# Patient Record
Sex: FEMALE | Race: WHITE | NOT HISPANIC OR LATINO | Employment: FULL TIME | URBAN - NONMETROPOLITAN AREA
[De-identification: names, ages, dates, MRNs, and addresses within clinical notes are randomized per-mention and may not be internally consistent; named-entity substitution may affect disease eponyms.]

---

## 2018-12-19 ENCOUNTER — HOSPITAL ENCOUNTER (OUTPATIENT)
Dept: GENERAL RADIOLOGY | Facility: HOSPITAL | Age: 38
Discharge: HOME | End: 2018-12-19
Admitting: OBSTETRICS & GYNECOLOGY

## 2018-12-19 ENCOUNTER — OFFICE VISIT (OUTPATIENT)
Dept: OBSTETRICS AND GYNECOLOGY | Facility: CLINIC | Age: 38
End: 2018-12-19

## 2018-12-19 ENCOUNTER — APPOINTMENT (OUTPATIENT)
Dept: PREADMISSION TESTING | Facility: HOSPITAL | Age: 38
End: 2018-12-19

## 2018-12-19 ENCOUNTER — PREP FOR SURGERY (OUTPATIENT)
Dept: OTHER | Facility: HOSPITAL | Age: 38
End: 2018-12-19

## 2018-12-19 VITALS
BODY MASS INDEX: 47.09 KG/M2 | DIASTOLIC BLOOD PRESSURE: 80 MMHG | WEIGHT: 293 LBS | SYSTOLIC BLOOD PRESSURE: 148 MMHG | HEIGHT: 66 IN

## 2018-12-19 VITALS — BODY MASS INDEX: 47.09 KG/M2 | HEIGHT: 66 IN | WEIGHT: 293 LBS

## 2018-12-19 DIAGNOSIS — T83.32XA INTRAUTERINE CONTRACEPTIVE DEVICE THREADS LOST, INITIAL ENCOUNTER: Primary | ICD-10-CM

## 2018-12-19 DIAGNOSIS — T83.32XA INTRAUTERINE DEVICE (IUD) MIGRATION, INITIAL ENCOUNTER: ICD-10-CM

## 2018-12-19 DIAGNOSIS — T83.32XA INTRAUTERINE CONTRACEPTIVE DEVICE THREADS LOST, INITIAL ENCOUNTER: ICD-10-CM

## 2018-12-19 LAB
ANION GAP SERPL CALCULATED.3IONS-SCNC: 11.3 MMOL/L (ref 10–20)
B-HCG UR QL: NEGATIVE
BILIRUB UR QL STRIP: NEGATIVE
BUN BLD-MCNC: 11 MG/DL (ref 7–20)
BUN/CREAT SERPL: 18.3 (ref 7.1–23.5)
CALCIUM SPEC-SCNC: 9.3 MG/DL (ref 8.4–10.2)
CHLORIDE SERPL-SCNC: 105 MMOL/L (ref 98–107)
CLARITY UR: CLEAR
CO2 SERPL-SCNC: 28 MMOL/L (ref 26–30)
COLOR UR: YELLOW
CREAT BLD-MCNC: 0.6 MG/DL (ref 0.6–1.3)
DEPRECATED RDW RBC AUTO: 47 FL (ref 37–54)
ERYTHROCYTE [DISTWIDTH] IN BLOOD BY AUTOMATED COUNT: 14.6 % (ref 11.5–14.5)
GFR SERPL CREATININE-BSD FRML MDRD: 112 ML/MIN/1.73
GLUCOSE BLD-MCNC: 136 MG/DL (ref 74–98)
GLUCOSE UR STRIP-MCNC: NEGATIVE MG/DL
HCT VFR BLD AUTO: 35.4 % (ref 37–47)
HGB BLD-MCNC: 11.1 G/DL (ref 12–16)
HGB UR QL STRIP.AUTO: NEGATIVE
KETONES UR QL STRIP: NEGATIVE
LEUKOCYTE ESTERASE UR QL STRIP.AUTO: NEGATIVE
MCH RBC QN AUTO: 27.3 PG (ref 27–31)
MCHC RBC AUTO-ENTMCNC: 31.4 G/DL (ref 30–37)
MCV RBC AUTO: 87.2 FL (ref 81–99)
NITRITE UR QL STRIP: NEGATIVE
PH UR STRIP.AUTO: 7.5 [PH] (ref 5–8)
PLATELET # BLD AUTO: 354 10*3/MM3 (ref 130–400)
PMV BLD AUTO: 9.6 FL (ref 6–12)
POTASSIUM BLD-SCNC: 4.3 MMOL/L (ref 3.5–5.1)
PROT UR QL STRIP: NEGATIVE
RBC # BLD AUTO: 4.06 10*6/MM3 (ref 4.2–5.4)
SODIUM BLD-SCNC: 140 MMOL/L (ref 137–145)
SP GR UR STRIP: 1.02 (ref 1–1.03)
UROBILINOGEN UR QL STRIP: NORMAL
WBC NRBC COR # BLD: 15.91 10*3/MM3 (ref 4.8–10.8)

## 2018-12-19 PROCEDURE — 85027 COMPLETE CBC AUTOMATED: CPT | Performed by: OBSTETRICS & GYNECOLOGY

## 2018-12-19 PROCEDURE — 80048 BASIC METABOLIC PNL TOTAL CA: CPT | Performed by: OBSTETRICS & GYNECOLOGY

## 2018-12-19 PROCEDURE — 36415 COLL VENOUS BLD VENIPUNCTURE: CPT

## 2018-12-19 PROCEDURE — 81025 URINE PREGNANCY TEST: CPT | Performed by: OBSTETRICS & GYNECOLOGY

## 2018-12-19 PROCEDURE — 99203 OFFICE O/P NEW LOW 30 MIN: CPT | Performed by: PHYSICIAN ASSISTANT

## 2018-12-19 PROCEDURE — 81003 URINALYSIS AUTO W/O SCOPE: CPT | Performed by: OBSTETRICS & GYNECOLOGY

## 2018-12-19 PROCEDURE — 74018 RADEX ABDOMEN 1 VIEW: CPT

## 2018-12-19 RX ORDER — SODIUM CHLORIDE 0.9 % (FLUSH) 0.9 %
3 SYRINGE (ML) INJECTION EVERY 12 HOURS SCHEDULED
Status: CANCELLED | OUTPATIENT
Start: 2018-12-19

## 2018-12-19 RX ORDER — DULOXETIN HYDROCHLORIDE 30 MG/1
60 CAPSULE, DELAYED RELEASE ORAL DAILY
Refills: 0 | COMMUNITY
Start: 2018-09-24 | End: 2020-08-07 | Stop reason: DRUGHIGH

## 2018-12-19 RX ORDER — INFLUENZA A VIRUS A/MICHIGAN/45/2015 X-275 (H1N1) ANTIGEN (FORMALDEHYDE INACTIVATED), INFLUENZA A VIRUS A/SINGAPORE/INFIMH-16-0019/2016 IVR-186 (H3N2) ANTIGEN (FORMALDEHYDE INACTIVATED), INFLUENZA B VIRUS B/PHUKET/3073/2013 ANTIGEN (FORMALDEHYDE INACTIVATED), AND INFLUENZA B VIRUS B/MARYLAND/15/2016 BX-69A ANTIGEN (FORMALDEHYDE INACTIVATED) 15; 15; 15; 15 UG/.5ML; UG/.5ML; UG/.5ML; UG/.5ML
INJECTION, SUSPENSION INTRAMUSCULAR
Refills: 0 | COMMUNITY
Start: 2018-12-17 | End: 2019-11-20

## 2018-12-19 RX ORDER — SODIUM CHLORIDE 0.9 % (FLUSH) 0.9 %
1-10 SYRINGE (ML) INJECTION AS NEEDED
Status: CANCELLED | OUTPATIENT
Start: 2018-12-19

## 2018-12-19 NOTE — PROGRESS NOTES
Subjective   Chief Complaint   Patient presents with   • Contraception     IUD removal; family planning       Kiley Hollingsworth is a 38 y.o. year old new patient  presenting to be seen for removal of Mirena which was inserted 2014.  She is contemplating another pregnancy but not 100% sure yet but she has decided wants IUD removed  She reports is having regular monthly bleeds with Mirena that last 3-4 days  She has recently come off cymbalta 4-6 weeks ago and thus far is doing well  She has no complaints today  Her last pap was 2013     Past Medical History:   Diagnosis Date   • Anxiety    • Body piercing     BOTH EARS   • Borderline glaucoma    • Depression    • Encounter for insertion of mirena IUD 2014    HIRAL MUSA   • History of Papanicolaou smear of cervix 2015    NORMAL    • Hypertension    • Wears glasses         Current Outpatient Medications:   •  FLUZONE QUADRIVALENT 0.5 ML suspension prefilled syringe injection, inject 0.5 milliliter intramuscularly, Disp: , Rfl: 0  •  DULoxetine (CYMBALTA) 30 MG capsule, Take 30 mg by mouth Daily., Disp: , Rfl: 0  •  Prenatal Vit-Fe Fumarate-FA (PRENATAL VITAMIN PO), Take 1 tablet by mouth Daily., Disp: , Rfl:    No Known Allergies   Past Surgical History:   Procedure Laterality Date   •  SECTION PRIMARY  2014    DR JORJE MERAZ   • ENDOSCOPY     • GALLBLADDER SURGERY     • LAPAROSCOPIC CHOLECYSTECTOMY     • WISDOM TOOTH EXTRACTION        Social History     Socioeconomic History   • Marital status: Single     Spouse name: Not on file   • Number of children: Not on file   • Years of education: Not on file   • Highest education level: Not on file   Social Needs   • Financial resource strain: Not on file   • Food insecurity - worry: Not on file   • Food insecurity - inability: Not on file   • Transportation needs - medical: Not on file   • Transportation needs - non-medical: Not on file   Occupational History   • Not on file  "  Tobacco Use   • Smoking status: Never Smoker   • Smokeless tobacco: Never Used   Substance and Sexual Activity   • Alcohol use: No     Frequency: Never   • Drug use: No   • Sexual activity: Defer   Other Topics Concern   • Not on file   Social History Narrative   • Not on file      Family History   Problem Relation Age of Onset   • Diabetes Father    • Diabetes Mother    • Coronary artery disease Mother    • Hypertension Mother    • Uterine cancer Paternal Grandmother    • Lung cancer Maternal Grandfather        Review of Systems   Constitutional: Negative.    Gastrointestinal: Negative.    Genitourinary: Negative.    All other systems reviewed and are negative.          Objective   /80   Ht 167.6 cm (66\")   Wt (!) 150 kg (330 lb 12.8 oz)   LMP 12/03/2018 (Exact Date)   Breastfeeding? No   BMI 53.39 kg/m²     Physical Exam   Constitutional: She is cooperative. No distress.   Morbidly obese   Eyes: Conjunctivae and lids are normal.   Abdominal: Soft. Bowel sounds are normal. There is no tenderness. There is no rigidity and no guarding.   Genitourinary: Vagina normal and uterus normal. There is no tenderness or lesion on the right labia. There is no tenderness or lesion on the left labia. Cervix exhibits no motion tenderness and no discharge. Right adnexum displays no mass and no tenderness. Left adnexum displays no mass and no tenderness.   Genitourinary Comments: iud strings not visible or palpable  Cervical os somewhat stenosed   Neurological: She is alert.   Skin: Skin is warm and dry.   Psychiatric: She has a normal mood and affect. Her behavior is normal.            Assessment and Plan  Kiley was seen today for contraception.    Diagnoses and all orders for this visit:    Intrauterine contraceptive device threads lost, initial encounter  -     Cancel: US Non-ob Transvaginal  -     XR Abdomen KUB    Intrauterine device (IUD) migration, initial encounter (CMS/[facility])  -     XR Abdomen KUB      Patient " Instructions   Pelvic ultrasound done as IUD strings are not visible or palpable. IUD is not seen in uterus per ultrasound tech. Will send patient down to radiology for stat KUB for IUD localization of IUD.  Dr. Gonzalez called with KUB report and stated that IUD had migrated out of uterus and sitting over the left iliac wing. Patient is informed of migration of IUD and need for laparoscopic removal.  Dr. Craig consulted and he placed case request for surgery.               This note was electronically signed.    Kiley Peralta PA-C   December 20, 2018

## 2018-12-19 NOTE — PATIENT INSTRUCTIONS
Pelvic ultrasound done as IUD strings are not visible or palpable. IUD is not seen in uterus per ultrasound tech. Will send patient down to radiology for stat KUB for IUD localization of IUD.  Dr. Gonzalez called with KUB report and stated that IUD had migrated out of uterus and sitting over the left iliac wing. Patient is informed of migration of IUD and need for laparoscopic removal.  Dr. Craig consulted and he placed case request for surgery.

## 2018-12-19 NOTE — H&P (VIEW-ONLY)
Subjective   Chief Complaint   Patient presents with   • Contraception     IUD removal; family planning       Kiley Hollingsworth is a 38 y.o. year old new patient  presenting to be seen for removal of Mirena which was inserted 2014.  She is contemplating another pregnancy but not 100% sure yet but she has decided wants IUD removed  She reports is having regular monthly bleeds with Mirena that last 3-4 days  She has recently come off cymbalta 4-6 weeks ago and thus far is doing well  She has no complaints today  Her last pap was 2013     Past Medical History:   Diagnosis Date   • Anxiety    • Body piercing     BOTH EARS   • Borderline glaucoma    • Depression    • Encounter for insertion of mirena IUD 2014    HIRAL MUSA   • History of Papanicolaou smear of cervix 2015    NORMAL    • Hypertension    • Wears glasses         Current Outpatient Medications:   •  FLUZONE QUADRIVALENT 0.5 ML suspension prefilled syringe injection, inject 0.5 milliliter intramuscularly, Disp: , Rfl: 0  •  DULoxetine (CYMBALTA) 30 MG capsule, Take 30 mg by mouth Daily., Disp: , Rfl: 0  •  Prenatal Vit-Fe Fumarate-FA (PRENATAL VITAMIN PO), Take 1 tablet by mouth Daily., Disp: , Rfl:    No Known Allergies   Past Surgical History:   Procedure Laterality Date   •  SECTION PRIMARY  2014    DR JORJE MERAZ   • ENDOSCOPY     • GALLBLADDER SURGERY     • LAPAROSCOPIC CHOLECYSTECTOMY     • WISDOM TOOTH EXTRACTION        Social History     Socioeconomic History   • Marital status: Single     Spouse name: Not on file   • Number of children: Not on file   • Years of education: Not on file   • Highest education level: Not on file   Social Needs   • Financial resource strain: Not on file   • Food insecurity - worry: Not on file   • Food insecurity - inability: Not on file   • Transportation needs - medical: Not on file   • Transportation needs - non-medical: Not on file   Occupational History   • Not on file  "  Tobacco Use   • Smoking status: Never Smoker   • Smokeless tobacco: Never Used   Substance and Sexual Activity   • Alcohol use: No     Frequency: Never   • Drug use: No   • Sexual activity: Defer   Other Topics Concern   • Not on file   Social History Narrative   • Not on file      Family History   Problem Relation Age of Onset   • Diabetes Father    • Diabetes Mother    • Coronary artery disease Mother    • Hypertension Mother    • Uterine cancer Paternal Grandmother    • Lung cancer Maternal Grandfather        Review of Systems   Constitutional: Negative.    Gastrointestinal: Negative.    Genitourinary: Negative.    All other systems reviewed and are negative.          Objective   /80   Ht 167.6 cm (66\")   Wt (!) 150 kg (330 lb 12.8 oz)   LMP 12/03/2018 (Exact Date)   Breastfeeding? No   BMI 53.39 kg/m²     Physical Exam   Constitutional: She is cooperative. No distress.   Morbidly obese   Eyes: Conjunctivae and lids are normal.   Abdominal: Soft. Bowel sounds are normal. There is no tenderness. There is no rigidity and no guarding.   Genitourinary: Vagina normal and uterus normal. There is no tenderness or lesion on the right labia. There is no tenderness or lesion on the left labia. Cervix exhibits no motion tenderness and no discharge. Right adnexum displays no mass and no tenderness. Left adnexum displays no mass and no tenderness.   Genitourinary Comments: iud strings not visible or palpable  Cervical os somewhat stenosed   Neurological: She is alert.   Skin: Skin is warm and dry.   Psychiatric: She has a normal mood and affect. Her behavior is normal.            Assessment and Plan  Kiley was seen today for contraception.    Diagnoses and all orders for this visit:    Intrauterine contraceptive device threads lost, initial encounter  -     Cancel: US Non-ob Transvaginal  -     XR Abdomen KUB    Intrauterine device (IUD) migration, initial encounter (CMS/[facility])  -     XR Abdomen KUB      Patient " Instructions   Pelvic ultrasound done as IUD strings are not visible or palpable. IUD is not seen in uterus per ultrasound tech. Will send patient down to radiology for stat KUB for IUD localization of IUD.  Dr. Gonzalez called with KUB report and stated that IUD had migrated out of uterus and sitting over the left iliac wing. Patient is informed of migration of IUD and need for laparoscopic removal.  Dr. Craig consulted and he placed case request for surgery.               This note was electronically signed.    Kiley Peralta PA-C   December 20, 2018

## 2018-12-20 ENCOUNTER — ANESTHESIA EVENT (OUTPATIENT)
Dept: PERIOP | Facility: HOSPITAL | Age: 38
End: 2018-12-20

## 2018-12-20 ENCOUNTER — ANESTHESIA (OUTPATIENT)
Dept: PERIOP | Facility: HOSPITAL | Age: 38
End: 2018-12-20

## 2018-12-20 ENCOUNTER — HOSPITAL ENCOUNTER (OUTPATIENT)
Facility: HOSPITAL | Age: 38
Setting detail: HOSPITAL OUTPATIENT SURGERY
Discharge: HOME | End: 2018-12-20
Attending: OBSTETRICS & GYNECOLOGY | Admitting: OBSTETRICS & GYNECOLOGY

## 2018-12-20 VITALS
HEART RATE: 72 BPM | TEMPERATURE: 99 F | OXYGEN SATURATION: 92 % | DIASTOLIC BLOOD PRESSURE: 57 MMHG | SYSTOLIC BLOOD PRESSURE: 129 MMHG | RESPIRATION RATE: 18 BRPM

## 2018-12-20 DIAGNOSIS — T83.32XA INTRAUTERINE DEVICE (IUD) MIGRATION, INITIAL ENCOUNTER: ICD-10-CM

## 2018-12-20 DIAGNOSIS — T83.32XA INTRAUTERINE CONTRACEPTIVE DEVICE THREADS LOST, INITIAL ENCOUNTER: ICD-10-CM

## 2018-12-20 PROCEDURE — 25010000002 KETOROLAC TROMETHAMINE PER 15 MG: Performed by: NURSE ANESTHETIST, CERTIFIED REGISTERED

## 2018-12-20 PROCEDURE — 25010000002 MIDAZOLAM PER 1 MG: Performed by: NURSE ANESTHETIST, CERTIFIED REGISTERED

## 2018-12-20 PROCEDURE — 25010000002 SUCCINYLCHOLINE PER 20 MG: Performed by: NURSE ANESTHETIST, CERTIFIED REGISTERED

## 2018-12-20 PROCEDURE — 57415 REMOVE VAGINAL FOREIGN BODY: CPT | Performed by: OBSTETRICS & GYNECOLOGY

## 2018-12-20 PROCEDURE — 25010000002 DEXAMETHASONE PER 1 MG: Performed by: NURSE ANESTHETIST, CERTIFIED REGISTERED

## 2018-12-20 PROCEDURE — 25010000002 ONDANSETRON PER 1 MG: Performed by: NURSE ANESTHETIST, CERTIFIED REGISTERED

## 2018-12-20 PROCEDURE — 25010000002 PROPOFOL 200 MG/20ML EMULSION: Performed by: NURSE ANESTHETIST, CERTIFIED REGISTERED

## 2018-12-20 PROCEDURE — 49320 DIAG LAPARO SEPARATE PROC: CPT | Performed by: OBSTETRICS & GYNECOLOGY

## 2018-12-20 PROCEDURE — 25010000002 FENTANYL CITRATE (PF) 100 MCG/2ML SOLUTION: Performed by: NURSE ANESTHETIST, CERTIFIED REGISTERED

## 2018-12-20 PROCEDURE — 25010000002 HYDROMORPHONE PER 4 MG: Performed by: NURSE ANESTHETIST, CERTIFIED REGISTERED

## 2018-12-20 RX ORDER — SODIUM CHLORIDE 0.9 % (FLUSH) 0.9 %
1-10 SYRINGE (ML) INJECTION AS NEEDED
Status: DISCONTINUED | OUTPATIENT
Start: 2018-12-20 | End: 2018-12-20 | Stop reason: HOSPADM

## 2018-12-20 RX ORDER — KETOROLAC TROMETHAMINE 30 MG/ML
INJECTION, SOLUTION INTRAMUSCULAR; INTRAVENOUS AS NEEDED
Status: DISCONTINUED | OUTPATIENT
Start: 2018-12-20 | End: 2018-12-20 | Stop reason: SURG

## 2018-12-20 RX ORDER — HYDROCODONE BITARTRATE AND ACETAMINOPHEN 7.5; 325 MG/1; MG/1
1 TABLET ORAL ONCE AS NEEDED
Status: DISCONTINUED | OUTPATIENT
Start: 2018-12-20 | End: 2018-12-20 | Stop reason: HOSPADM

## 2018-12-20 RX ORDER — SUCCINYLCHOLINE CHLORIDE 20 MG/ML
INJECTION INTRAMUSCULAR; INTRAVENOUS AS NEEDED
Status: DISCONTINUED | OUTPATIENT
Start: 2018-12-20 | End: 2018-12-20 | Stop reason: SURG

## 2018-12-20 RX ORDER — PROMETHAZINE HYDROCHLORIDE 25 MG/ML
6.25 INJECTION, SOLUTION INTRAMUSCULAR; INTRAVENOUS ONCE AS NEEDED
Status: DISCONTINUED | OUTPATIENT
Start: 2018-12-20 | End: 2018-12-20 | Stop reason: HOSPADM

## 2018-12-20 RX ORDER — MEPERIDINE HYDROCHLORIDE 50 MG/ML
12.5 INJECTION INTRAMUSCULAR; INTRAVENOUS; SUBCUTANEOUS
Status: DISCONTINUED | OUTPATIENT
Start: 2018-12-20 | End: 2018-12-20 | Stop reason: HOSPADM

## 2018-12-20 RX ORDER — PROPOFOL 10 MG/ML
INJECTION, EMULSION INTRAVENOUS AS NEEDED
Status: DISCONTINUED | OUTPATIENT
Start: 2018-12-20 | End: 2018-12-20 | Stop reason: SURG

## 2018-12-20 RX ORDER — DEXAMETHASONE SODIUM PHOSPHATE 4 MG/ML
INJECTION, SOLUTION INTRA-ARTICULAR; INTRALESIONAL; INTRAMUSCULAR; INTRAVENOUS; SOFT TISSUE AS NEEDED
Status: DISCONTINUED | OUTPATIENT
Start: 2018-12-20 | End: 2018-12-20 | Stop reason: SURG

## 2018-12-20 RX ORDER — IBUPROFEN 600 MG/1
600 TABLET ORAL EVERY 6 HOURS PRN
Status: DISCONTINUED | OUTPATIENT
Start: 2018-12-20 | End: 2018-12-20 | Stop reason: HOSPADM

## 2018-12-20 RX ORDER — GLYCOPYRROLATE 0.2 MG/ML
INJECTION INTRAMUSCULAR; INTRAVENOUS AS NEEDED
Status: DISCONTINUED | OUTPATIENT
Start: 2018-12-20 | End: 2018-12-20 | Stop reason: SURG

## 2018-12-20 RX ORDER — IPRATROPIUM BROMIDE AND ALBUTEROL SULFATE 2.5; .5 MG/3ML; MG/3ML
3 SOLUTION RESPIRATORY (INHALATION) ONCE AS NEEDED
Status: DISCONTINUED | OUTPATIENT
Start: 2018-12-20 | End: 2018-12-20 | Stop reason: HOSPADM

## 2018-12-20 RX ORDER — SODIUM CHLORIDE, SODIUM LACTATE, POTASSIUM CHLORIDE, CALCIUM CHLORIDE 600; 310; 30; 20 MG/100ML; MG/100ML; MG/100ML; MG/100ML
1000 INJECTION, SOLUTION INTRAVENOUS CONTINUOUS
Status: DISCONTINUED | OUTPATIENT
Start: 2018-12-20 | End: 2018-12-20 | Stop reason: HOSPADM

## 2018-12-20 RX ORDER — ACETAMINOPHEN 500 MG
1000 TABLET ORAL EVERY 8 HOURS PRN
Qty: 60 TABLET | Refills: 0 | Status: SHIPPED | OUTPATIENT
Start: 2018-12-20 | End: 2019-11-20

## 2018-12-20 RX ORDER — ONDANSETRON 2 MG/ML
4 INJECTION INTRAMUSCULAR; INTRAVENOUS ONCE AS NEEDED
Status: DISCONTINUED | OUTPATIENT
Start: 2018-12-20 | End: 2018-12-20 | Stop reason: HOSPADM

## 2018-12-20 RX ORDER — IBUPROFEN 800 MG/1
800 TABLET ORAL EVERY 8 HOURS PRN
Qty: 30 TABLET | Refills: 0 | Status: SHIPPED | OUTPATIENT
Start: 2018-12-20 | End: 2019-11-20

## 2018-12-20 RX ORDER — PROMETHAZINE HYDROCHLORIDE 25 MG/1
25 SUPPOSITORY RECTAL ONCE AS NEEDED
Status: DISCONTINUED | OUTPATIENT
Start: 2018-12-20 | End: 2018-12-20 | Stop reason: HOSPADM

## 2018-12-20 RX ORDER — ROCURONIUM BROMIDE 10 MG/ML
INJECTION, SOLUTION INTRAVENOUS AS NEEDED
Status: DISCONTINUED | OUTPATIENT
Start: 2018-12-20 | End: 2018-12-20 | Stop reason: SURG

## 2018-12-20 RX ORDER — SODIUM CHLORIDE 0.9 % (FLUSH) 0.9 %
3 SYRINGE (ML) INJECTION EVERY 12 HOURS SCHEDULED
Status: DISCONTINUED | OUTPATIENT
Start: 2018-12-20 | End: 2018-12-20 | Stop reason: HOSPADM

## 2018-12-20 RX ORDER — FENTANYL CITRATE 50 UG/ML
INJECTION, SOLUTION INTRAMUSCULAR; INTRAVENOUS AS NEEDED
Status: DISCONTINUED | OUTPATIENT
Start: 2018-12-20 | End: 2018-12-20 | Stop reason: SURG

## 2018-12-20 RX ORDER — MAGNESIUM HYDROXIDE 1200 MG/15ML
LIQUID ORAL AS NEEDED
Status: DISCONTINUED | OUTPATIENT
Start: 2018-12-20 | End: 2018-12-20 | Stop reason: HOSPADM

## 2018-12-20 RX ORDER — MIDAZOLAM HYDROCHLORIDE 1 MG/ML
INJECTION INTRAMUSCULAR; INTRAVENOUS AS NEEDED
Status: DISCONTINUED | OUTPATIENT
Start: 2018-12-20 | End: 2018-12-20 | Stop reason: SURG

## 2018-12-20 RX ORDER — OXYCODONE HYDROCHLORIDE 5 MG/1
5 TABLET ORAL EVERY 4 HOURS PRN
Qty: 10 TABLET | Refills: 0 | Status: SHIPPED | OUTPATIENT
Start: 2018-12-20 | End: 2019-11-20

## 2018-12-20 RX ORDER — HYDROMORPHONE HCL 110MG/55ML
PATIENT CONTROLLED ANALGESIA SYRINGE INTRAVENOUS AS NEEDED
Status: DISCONTINUED | OUTPATIENT
Start: 2018-12-20 | End: 2018-12-20 | Stop reason: SURG

## 2018-12-20 RX ORDER — NEOSTIGMINE METHYLSULFATE 5 MG/5 ML
SYRINGE (ML) INTRAVENOUS AS NEEDED
Status: DISCONTINUED | OUTPATIENT
Start: 2018-12-20 | End: 2018-12-20 | Stop reason: SURG

## 2018-12-20 RX ORDER — PROMETHAZINE HYDROCHLORIDE 25 MG/1
25 TABLET ORAL ONCE AS NEEDED
Status: DISCONTINUED | OUTPATIENT
Start: 2018-12-20 | End: 2018-12-20 | Stop reason: HOSPADM

## 2018-12-20 RX ORDER — ONDANSETRON 2 MG/ML
INJECTION INTRAMUSCULAR; INTRAVENOUS AS NEEDED
Status: DISCONTINUED | OUTPATIENT
Start: 2018-12-20 | End: 2018-12-20 | Stop reason: SURG

## 2018-12-20 RX ADMIN — SODIUM CHLORIDE, POTASSIUM CHLORIDE, SODIUM LACTATE AND CALCIUM CHLORIDE: 600; 310; 30; 20 INJECTION, SOLUTION INTRAVENOUS at 10:46

## 2018-12-20 RX ADMIN — SUCCINYLCHOLINE CHLORIDE 180 MG: 20 INJECTION, SOLUTION INTRAMUSCULAR; INTRAVENOUS at 10:23

## 2018-12-20 RX ADMIN — LIDOCAINE HYDROCHLORIDE 60 MG: 20 INJECTION, SOLUTION INTRAVENOUS at 10:23

## 2018-12-20 RX ADMIN — Medication 4 MG: at 11:35

## 2018-12-20 RX ADMIN — ROCURONIUM BROMIDE 10 MG: 10 INJECTION INTRAVENOUS at 10:23

## 2018-12-20 RX ADMIN — PROPOFOL 200 MG: 10 INJECTION, EMULSION INTRAVENOUS at 10:23

## 2018-12-20 RX ADMIN — HYDROMORPHONE HYDROCHLORIDE 0.5 MG: 2 INJECTION, SOLUTION INTRAMUSCULAR; INTRAVENOUS; SUBCUTANEOUS at 11:01

## 2018-12-20 RX ADMIN — MIDAZOLAM HYDROCHLORIDE 2 MG: 1 INJECTION, SOLUTION INTRAMUSCULAR; INTRAVENOUS at 10:21

## 2018-12-20 RX ADMIN — HYDROMORPHONE HYDROCHLORIDE 0.5 MG: 2 INJECTION, SOLUTION INTRAMUSCULAR; INTRAVENOUS; SUBCUTANEOUS at 11:21

## 2018-12-20 RX ADMIN — ROCURONIUM BROMIDE 20 MG: 10 INJECTION INTRAVENOUS at 11:08

## 2018-12-20 RX ADMIN — SODIUM CHLORIDE, POTASSIUM CHLORIDE, SODIUM LACTATE AND CALCIUM CHLORIDE 1000 ML: 600; 310; 30; 20 INJECTION, SOLUTION INTRAVENOUS at 08:33

## 2018-12-20 RX ADMIN — FENTANYL CITRATE 100 MCG: 50 INJECTION, SOLUTION INTRAMUSCULAR; INTRAVENOUS at 10:21

## 2018-12-20 RX ADMIN — DEXAMETHASONE SODIUM PHOSPHATE 4 MG: 4 INJECTION, SOLUTION INTRAMUSCULAR; INTRAVENOUS at 11:35

## 2018-12-20 RX ADMIN — ONDANSETRON 4 MG: 2 INJECTION INTRAMUSCULAR; INTRAVENOUS at 11:35

## 2018-12-20 RX ADMIN — KETOROLAC TROMETHAMINE 30 MG: 30 INJECTION, SOLUTION INTRAMUSCULAR at 11:35

## 2018-12-20 RX ADMIN — GLYCOPYRROLATE 0.4 MG: 0.2 INJECTION, SOLUTION INTRAMUSCULAR; INTRAVENOUS at 11:35

## 2018-12-20 NOTE — INTERVAL H&P NOTE
GYN History and Physical Update     HISTORY:  Kiley Hollingsworth is a 38 y.o.  female who presents for laparoscopic removal of an IUD after the device migrated through the uterus.    KUB shows that the IUD has migrated out of uterus and is sitting over the left iliac wing.    She was last seen on 2018 and complete history and physical performed. The surgical history has been reviewed and remains accurate without interval change. The patient was re-examined and patient's physiologic condition has not changed significantly in the last 30 days. No new pharmacological allergies or types of therapy have been initiated.     EXAM  Vitals:    18 0820   BP: 163/85   Pulse: 73   Resp: 18   Temp: 98.6 °F (37 °C)   SpO2: 99%     General:  WDWN female in NAD   CV:  RRR no murmurs   Lungs:  CTAB, no wheezing, resps unlabored   Abd:  +BS, soft, NTTP   Pelvic exam deferred     IMPRESSION/PLAN:  The condition still exists that makes this procedure necessary. The treatment plan remains the same, without new options for care. The patient understands the potential benefits and risks and the consents have been signed and placed on the chart.     Isaias Craig MD  Obstetrics and Gynecology  [facility]

## 2018-12-20 NOTE — OP NOTE
Yossi Hollingsworth  : [id number]  CSN: [id number]  Date: 2018    Operative Note    Pre-op Diagnosis:  IUD migration   Morbid obesity - BMI 53   Post-op Diagnosis:  Same as above   Procedure: Diagnostic laparoscopy   Resection and removal of foreign body (IUD)   Surgeon:  OR Staff: Isaias Craig M.D.  Circulator: Timmy Sepulveda RN; Pauline Marin RN  Scrub Person: Santy Walden; Tiburcio Akins   Anesthesia:  EBL: General endotracheal anesthesia  Minimal   Specimens:   Complications: None  None       Indications:   Patient is a 38 y.o. year old  female who was found yesterday in clinic to have an IUD that had migrated through the uterus and was free in the pelvis.  KUB revealed the device located near the left iliac wing.    Procedure:   After the appropriate time out and adequate dosing of her anesthesia, the patient was prepped and draped in the usual sterile fashion.  A liu catheter was placed in the bladder for drainage during the procedure.  She was placed in the dorsal lithotomy position using Rojas stirrups. A sponge stick was placed in the vagina for manipulation.  A 12mm infraumbilical incision was made with the scalpel.  An appropriate-sized trocar and scope were then inserted into the peritoneal cavity under direct visualization.  The insertion site was hemostatic and atraumatic. Pneumoperitoneum was established with CO2 gas to a pressure of 15 mmHg.  The patient could only tolerate mild Trendelenburg positioning in order to maintain her respiratory status.  A 5 mm left lower quadrant trocar was inserted lateral to the inferior epigastric vessels under direct visualization.  An atraumatic grasper was used to manipulate the omentum and small bowel until the IUD could be located.  The device was densely adherent within the omentum along the patient's left side requiring dissection for removal.  An additional 5 mm trocar was added to the right lower quadrant lateral to  the inferior epigastric vessels under direct visualization.  The device was elevated towards the abdominal wall with an atraumatic grasper and monopolar scissors were used to dissect the device free from the omentum.  A 5 mm camera was then inserted to the right lower quadrant trocar and the IUD was removed from the abdomen through the larger infraumbilical port.  The device was inspected and found to be completely intact.  The larger camera was then reinserted through the infraumbilical port and the bipolar Kleppinger was used to cauterize the omental surgical site. Careful inspection revealed adequate hemostasis at all surgical sites.  The uterus, bilateral fallopian tubes and ovaries were all normal in appearance.  There were no obvious signs of perforation along the uterine serosa. The lateral trocars were removed under direct visualization and the pneumoperitoneum was released. The umbilical trocar was then removed and all skin incisions were noted to be hemostatic. These sites were closed with 3-0 Monocryl suture in a subcuticular fashion. The sponge stick was removed from the vagina. The patient tolerated the procedure well. There were no complications. All instruments and sponge counts were correct at the end of the procedure. She was taken to postoperative recovery room in stable condition.    Isaias Craig MD  Obstetrics and Gynecology  [facility]

## 2018-12-20 NOTE — ANESTHESIA PROCEDURE NOTES
ANESTHESIA INTUBATION  Urgency: elective    Airway not difficult    General Information and Staff    Patient location during procedure: OR  CRNA: Tiffani Shoemaker CRNA    Indications and Patient Condition  Indications for airway management: airway protection    Preoxygenated: yes  MILS not maintained throughout  Mask difficulty assessment: 1 - vent by mask    Final Airway Details  Final airway type: endotracheal airway      Successful airway: ETT  Cuffed: yes   Successful intubation technique: direct laryngoscopy  Facilitating devices/methods: intubating stylet  Endotracheal tube insertion site: oral  Blade: Chip  Blade size: 3  ETT size (mm): 7.0  Cormack-Lehane Classification: grade I - full view of glottis  Placement verified by: chest auscultation and capnometry   Cuff volume (mL): 5  Measured from: lips  ETT to lips (cm): 20  Number of attempts at approach: 1    Additional Comments  Negative epigastric sounds, Breath sound equal bilaterally with symmetric chest rise and fall, teeth as pre-op, eyes taped prior to DL

## 2018-12-20 NOTE — ANESTHESIA POSTPROCEDURE EVALUATION
Patient: Kiley Díaz Hollingsworth    Procedure Summary     Date:  12/20/18 Room / Location:  [facility] OR 2 /  [facility] OR    Anesthesia Start:  1018 Anesthesia Stop:  1145    Procedure:  DIAGNOSTIC LAPAROSCOPY, RETRIEVAL OF FOREIGN BODY AND ALL INDICATED PROCEDURES (N/A Abdomen) Diagnosis:       Intrauterine contraceptive device threads lost, initial encounter      Intrauterine device (IUD) migration, initial encounter (CMS/HCC)      (Intrauterine contraceptive device threads lost, initial encounter [T83.32XA])      (Intrauterine device (IUD) migration, initial encounter (CMS/HCC) [T83.89XA])    Surgeon:  Isaias Craig MD Provider:  Tiffani Shoemaker CRNA    Anesthesia Type:  general ASA Status:  3          Anesthesia Type: general  Last vitals  BP   123/73 (12/20/18 1306)   Temp   98.4 °F (36.9 °C) (12/20/18 1300)   Pulse   67 (12/20/18 1306)   Resp   18 (12/20/18 1306)     SpO2   92 % (12/20/18 1306)     Post Anesthesia Care and Evaluation    Patient location during evaluation: PHASE II  Patient participation: complete - patient participated  Level of consciousness: awake and alert  Pain score: 2  Pain management: satisfactory to patient  Airway patency: patent  Anesthetic complications: No anesthetic complications  PONV Status: none  Cardiovascular status: acceptable and stable  Respiratory status: acceptable  Hydration status: acceptable

## 2018-12-20 NOTE — DISCHARGE INSTRUCTIONS
Please follow all post op instructions and follow up appointment time from your physician's office included in your discharge packet.    Pelvic rest is best described as not putting anything in your vagina. This includes tampons, douching, tub bathing or sexual activity.              Pelvic Surgery  Home Care Instructions:  Dr. Isaias Craig      Thank you for choosing [facility]. Please let us know if you have questions or concerns or do not understand the information we give you. Always ask us to explain words or phrases you do not understand.    You have had pelvic surgery. Here are some basic guidelines to help you recover more quickly at home. Your doctor may give you more guidelines. If you have any questions after you go home, please call the numbers listed on the next page.    General Guidelines    1. You may resume normal daily activities.  2. Do not put anything in the vagina (no tampons or douching).    3.   Do not have sex until cleared by your physician.  4.   You may climb steps.  5. You may bathe or shower.  6. The only exercise you should do is walking. This is important for your recovery.  7. Do not drive while taking narcotics.  8. Take the medicines you were taking before surgery. Other medicines you need to take at home are:    Alternate Motrin and Tylenol around the clock. Take each every 8 hours in alternation so that you are getting one of the medications every 4 hours.   Roxicodone 5mg tab  - One tablet by mouth every 4-6 hours as needed for breakthrough pain   Metamucil daily to keep bowel movements soft        If you have questions about your medicines, let us know. Or, talk to your local pharmacist.    9. Surgery, lack of activity, pain medicines and iron pills tend to cause constipation. Take something every day to prevent constipation and straining.  Taking something like Metamucil® every day to increase fiber may prevent constipation. Follow the instructions on the  container. If you need a gentle laxative, then something like Milk of Magnesia® or Correctol® work fairly well. You should not need to take laxatives often.    10. Call your doctor if you have:     a. Fever of 101 degrees or higher.  b. Heavy vaginal bleeding.  c. Increased redness around your incision (cut); incision pulling apart; drainage (pus), bleeding, or a large amount of fluid from your incision.  d. Worsening nausea or pain.  e. Other problems or concern.    If you have any questions or concerns about your usual routines, please talk to the nurse or doctor.       To talk with your doctor at [facility], call:  Obstetrics and Gynecology Outpatient Clinic  Phone: [phone]      We appreciate the opportunity you have given us to participate in your care.    --------------------------------------------------------------------------------------------------------------------------------------------------------------------------------------------------------------------------  Rest today  No pushing,pulling,tugging,heavy lifting, or strenuous activity   No major decision making,driving,or drinking alcoholic beverages for 24 hours due to the sedation you received  Always use good hand hygiene/washing technique  No driving on pain medication.    To assist you in voiding:  Drink plenty of fluids  Listen to running water while attempting to void.    If you are unable to urinate and you have an uncomfortable urge to void or it has been   6 hours since you were discharged, return to the Emergency Room.

## 2018-12-20 NOTE — ANESTHESIA PREPROCEDURE EVALUATION
Anesthesia Evaluation     Patient summary reviewed and Nursing notes reviewed   no history of anesthetic complications:  NPO Solid Status: > 8 hours  NPO Liquid Status: > 8 hours           Airway   Mallampati: II  TM distance: >3 FB  Neck ROM: full  no difficulty expected, Possible difficult intubation and Narrow palate  Dental - normal exam     Pulmonary - negative pulmonary ROS and normal exam   Cardiovascular - normal exam    (+) hypertension,       Neuro/Psych- negative ROS  GI/Hepatic/Renal/Endo    (+) obesity,       Musculoskeletal (-) negative ROS    Abdominal    Substance History - negative use     OB/GYN negative ob/gyn ROS         Other - negative ROS                       Anesthesia Plan    ASA 3     general     intravenous induction   Anesthetic plan, all risks, benefits, and alternatives have been provided, discussed and informed consent has been obtained with: patient.

## 2018-12-27 ENCOUNTER — OFFICE VISIT (OUTPATIENT)
Dept: OBSTETRICS AND GYNECOLOGY | Facility: CLINIC | Age: 38
End: 2018-12-27

## 2018-12-27 VITALS
DIASTOLIC BLOOD PRESSURE: 84 MMHG | WEIGHT: 293 LBS | HEIGHT: 66 IN | SYSTOLIC BLOOD PRESSURE: 140 MMHG | BODY MASS INDEX: 47.09 KG/M2

## 2018-12-27 DIAGNOSIS — E66.01 MORBID OBESITY WITH BMI OF 50.0-59.9, ADULT (HCC): ICD-10-CM

## 2018-12-27 DIAGNOSIS — Z98.890 POST-OPERATIVE STATE: ICD-10-CM

## 2018-12-27 DIAGNOSIS — Z98.890 S/P LAPAROSCOPY: Primary | ICD-10-CM

## 2018-12-27 DIAGNOSIS — Z31.69 PRE-CONCEPTION COUNSELING: ICD-10-CM

## 2018-12-27 DIAGNOSIS — T83.32XD INTRAUTERINE DEVICE (IUD) MIGRATION, SUBSEQUENT ENCOUNTER: ICD-10-CM

## 2018-12-27 PROBLEM — T83.32XA IUD MIGRATION: Status: RESOLVED | Noted: 2018-12-19 | Resolved: 2018-12-27

## 2018-12-27 PROCEDURE — 99024 POSTOP FOLLOW-UP VISIT: CPT | Performed by: OBSTETRICS & GYNECOLOGY

## 2018-12-27 NOTE — PROGRESS NOTES
Chief Complaint   Patient presents with   • Post-op     1 week PO Dx Lap, Removal of IUD, No complaints       38 y.o.  female who presents for a post-op visit.    Pt is POD#7 s/p laparoscopic removal of a migrated IUD on 2018.    Pt reports that pain is well controlled, she is eating/drinking/stooling/voiding without issues. She is ambulating well. She denies vaginal bleeding. She has no complaints.    She is interested in possibly attempting conception in the coming months.  She is currently taking a prenatal vitamin daily.  She would like to address weight loss prior to conception.    She denies nausea, emesis, fevers, chills, significant weight changes, hair/skin/nail changes, dysuria, urinary frequency, palpitations, chest pain, headaches, myalgia, dyspnea.            Review of Systems: All systems reviewed and negative except as noted in the HPI    Current Outpatient Medications on File Prior to Visit   Medication Sig Dispense Refill   • FLUZONE QUADRIVALENT 0.5 ML suspension prefilled syringe injection inject 0.5 milliliter intramuscularly  0   • Prenatal Vit-Fe Fumarate-FA (PRENATAL VITAMIN PO) Take 1 tablet by mouth Daily.     • acetaminophen (TYLENOL) 500 MG tablet Take 2 tablets by mouth Every 8 (Eight) Hours As Needed for Mild Pain . 60 tablet 0   • DULoxetine (CYMBALTA) 30 MG capsule Take 30 mg by mouth Daily.  0   • ibuprofen (ADVIL,MOTRIN) 800 MG tablet Take 1 tablet by mouth Every 8 (Eight) Hours As Needed for Mild Pain 30 tablet 0   • oxyCODONE (ROXICODONE) 5 MG immediate release tablet Take 1 tablet by mouth Every 4 (Four) Hours As Needed for Severe Pain . 10 tablet 0     No current facility-administered medications on file prior to visit.        No Known Allergies    Past Medical History:   Diagnosis Date   • Anxiety    • Body piercing     BOTH EARS   • Borderline glaucoma    • Depression    • Encounter for insertion of mirena IUD 2014    HIRAL MUSA   • History of Papanicolaou  "smear of cervix 2015    NORMAL    • Hypertension    • Wears glasses        Past Surgical History:   Procedure Laterality Date   •  SECTION PRIMARY  2014    DR JORJE MERAZ   • ENDOSCOPY     • GALLBLADDER SURGERY     • LAPAROSCOPIC CHOLECYSTECTOMY     • WISDOM TOOTH EXTRACTION          OB History      Para Term  AB Living    1 1 1     1    SAB TAB Ectopic Molar Multiple Live Births              1           Family History   Problem Relation Age of Onset   • Diabetes Father    • Diabetes Mother    • Coronary artery disease Mother    • Hypertension Mother    • Uterine cancer Paternal Grandmother    • Lung cancer Maternal Grandfather        Social History     Socioeconomic History   • Marital status:      Spouse name: Not on file   • Number of children: Not on file   • Years of education: Not on file   • Highest education level: Not on file   Social Needs   • Financial resource strain: Not on file   • Food insecurity - worry: Not on file   • Food insecurity - inability: Not on file   • Transportation needs - medical: Not on file   • Transportation needs - non-medical: Not on file   Occupational History   • Not on file   Tobacco Use   • Smoking status: Never Smoker   • Smokeless tobacco: Never Used   Substance and Sexual Activity   • Alcohol use: No     Frequency: Never   • Drug use: No   • Sexual activity: Defer   Other Topics Concern   • Not on file   Social History Narrative   • Not on file        Vitals:    18 0901   BP: 140/84   Weight: (!) 150 kg (331 lb)   Height: 167.6 cm (66\")       PHYSICAL EXAM   General Appearance: alert, pleasant, appears stated age, interactive and cooperative  Head: normocephalic, without obvious abnormality and atraumatic  Eyes: lids and lashes normal and no icterus  Ears: ears appear intact with no abnormalities noted  Nose: nares normal, septum midline, mucosa normal and no drainage  Neck: suppple, trachea midline and no thyromegaly  Back: " no kyphosis present, no scoliosis present and range of motion normal  Lungs: respirations regular, respirations even and respirations unlabored, clear to auscultation bilaterally   Heart: regular rhythm and normal rate, normal S1, S2, no murmur, gallop, or rubs and no click  Abdomen: no masses, no hepatomegaly, no splenomegaly, soft non-tender, no guarding and no rebound tenderness  Extremities: moves extremities well, no edema, no cyanosis and no redness  Skin: no bleeding, bruising or rash and no lesions noted  Lymph Nodes: no palpable adenopathy  Neurologic: Cranial Nerves cranial nerves 2 - 12 grossly intact, Speech normal content and execusion, Coordination normal  Psych: normal mood and affect, oriented to person, time and place, thought content organized and appropriate judgment    IMPRESSION/PLAN:    IUD migration s/p laparoscopic retrieval  Post-op evaluation  Morbid obesity - BMI 53  Preconception counseling    - Pt meeting all post-op milestones  - We reviewed surgical findings  - We discussed gastric bypass and lap banding procedures  - We discussed the importance of weight loss for her overall health and the risks obesity poses for a potential pregnancy  - Rubella and Varicella immunity testing ordered today  - Continue daily prenatal vitamins    Isaias Craig MD  Obstetrics and Gynecology  [facility]

## 2018-12-28 LAB
RUBV IGG SERPL IA-ACNC: 1.13 INDEX
VZV IGG SER IA-ACNC: 1939 INDEX

## 2019-11-20 ENCOUNTER — OFFICE VISIT (OUTPATIENT)
Dept: OBSTETRICS AND GYNECOLOGY | Facility: CLINIC | Age: 39
End: 2019-11-20

## 2019-11-20 VITALS
WEIGHT: 293 LBS | DIASTOLIC BLOOD PRESSURE: 76 MMHG | HEIGHT: 66 IN | SYSTOLIC BLOOD PRESSURE: 140 MMHG | BODY MASS INDEX: 47.09 KG/M2

## 2019-11-20 DIAGNOSIS — Z01.419 ENCOUNTER FOR GYNECOLOGICAL EXAMINATION WITHOUT ABNORMAL FINDING: Primary | ICD-10-CM

## 2019-11-20 DIAGNOSIS — Z12.4 SCREENING FOR CERVICAL CANCER: ICD-10-CM

## 2019-11-20 PROCEDURE — 99395 PREV VISIT EST AGE 18-39: CPT | Performed by: PHYSICIAN ASSISTANT

## 2019-11-20 RX ORDER — LISINOPRIL 10 MG/1
10 TABLET ORAL DAILY
Refills: 0 | COMMUNITY
Start: 2019-11-04 | End: 2023-03-11

## 2019-11-20 NOTE — PROGRESS NOTES
Subjective   Chief Complaint   Patient presents with   • Gynecologic Exam     Last pap done 5 years ago; No complaints       Kiley Hollingsworth is a 39 y.o. year old  presenting to be seen for her annual gynecological exam.   She has no complaints or concerns  Her LMP was 19 and cycles regular  She is  from  and not sexually active  Has no family history of breast cancer      Past Medical History:   Diagnosis Date   • Anxiety    • Body piercing     BOTH EARS   • Borderline glaucoma    • Depression    • Diabetes mellitus (CMS/HCC)    • Encounter for insertion of mirena IUD 2014    HIRAL DIMPLE   • History of Papanicolaou smear of cervix 2015    NORMAL    • Hypertension    • Wears glasses         Current Outpatient Medications:   •  DULoxetine (CYMBALTA) 30 MG capsule, Take 30 mg by mouth Daily., Disp: , Rfl: 0  •  lisinopril (PRINIVIL,ZESTRIL) 10 MG tablet, , Disp: , Rfl: 0  •  metFORMIN (GLUCOPHAGE) 500 MG tablet, Take 500 mg by mouth 2 (Two) Times a Day With Meals., Disp: , Rfl: 0   No Known Allergies   Past Surgical History:   Procedure Laterality Date   •  SECTION PRIMARY  2014    DR JORJE MERAZ   • DIAGNOSTIC LAPAROSCOPY N/A 2018    Procedure: DIAGNOSTIC LAPAROSCOPY, RETRIEVAL OF FOREIGN BODY AND ALL INDICATED PROCEDURES;  Surgeon: Isaias Craig MD;  Location: [facility];  Service: Obstetrics/Gynecology   • ENDOSCOPY     • GALLBLADDER SURGERY     • LAPAROSCOPIC CHOLECYSTECTOMY     • WISDOM TOOTH EXTRACTION        Social History     Socioeconomic History   • Marital status:      Spouse name: Not on file   • Number of children: Not on file   • Years of education: Not on file   • Highest education level: Not on file   Tobacco Use   • Smoking status: Never Smoker   • Smokeless tobacco: Never Used   Substance and Sexual Activity   • Alcohol use: No     Frequency: Never   • Drug use: No   • Sexual activity: Defer      Family History   Problem  "Relation Age of Onset   • Diabetes Father    • Diabetes Mother    • Coronary artery disease Mother    • Hypertension Mother    • Uterine cancer Paternal Grandmother    • Lung cancer Maternal Grandfather        Review of Systems   Constitutional: Negative.    Gastrointestinal: Negative.    Genitourinary: Negative for difficulty urinating, dysuria, menstrual problem, pelvic pain and vaginal discharge.   All other systems reviewed and are negative.          Objective   /76   Ht 167.6 cm (66\")   Wt (!) 145 kg (319 lb 9.6 oz)   LMP 11/06/2019 (Exact Date)   Breastfeeding? No   BMI 51.58 kg/m²     Physical Exam   Constitutional: She appears well-developed and well-nourished. She is cooperative. No distress.   Pulmonary/Chest: Right breast exhibits inverted nipple. Right breast exhibits no mass, no nipple discharge, no skin change and no tenderness. Left breast exhibits inverted nipple. Left breast exhibits no mass, no nipple discharge, no skin change and no tenderness.   Abdominal: Soft. Normal appearance. There is no tenderness.   Genitourinary: Uterus normal. There is no tenderness or lesion on the right labia. There is no tenderness or lesion on the left labia. Cervix exhibits no motion tenderness, no discharge and no friability. Right adnexum displays no mass and no tenderness. Left adnexum displays no mass and no tenderness. No erythema or tenderness in the vagina. No vaginal discharge found.   Genitourinary Comments: Pap done   Neurological: She is alert.   Skin: Skin is warm and dry. No lesion and no rash noted.   Psychiatric: She has a normal mood and affect. Her behavior is normal. Thought content normal.            Assessment and Plan  Kiley was seen today for gynecologic exam.    Diagnoses and all orders for this visit:    Encounter for gynecological examination without abnormal finding    Screening for cervical cancer  -     Liquid-based Pap Smear, Screening; Future      Patient Instructions "   Encourage self breast exam monthly  Screening mammogram age 40  Regular excercise             This note was electronically signed.    Kiley Peralta PA-C   November 20, 2019

## 2019-11-26 DIAGNOSIS — Z12.4 SCREENING FOR CERVICAL CANCER: ICD-10-CM

## 2020-05-20 ENCOUNTER — OFFICE VISIT (OUTPATIENT)
Dept: OBSTETRICS AND GYNECOLOGY | Facility: CLINIC | Age: 40
End: 2020-05-20

## 2020-05-20 VITALS
BODY MASS INDEX: 47.09 KG/M2 | DIASTOLIC BLOOD PRESSURE: 76 MMHG | WEIGHT: 293 LBS | HEIGHT: 66 IN | SYSTOLIC BLOOD PRESSURE: 126 MMHG

## 2020-05-20 DIAGNOSIS — Z30.017 ENCOUNTER FOR INITIAL PRESCRIPTION OF IMPLANTABLE SUBDERMAL CONTRACEPTIVE: Primary | ICD-10-CM

## 2020-05-20 PROCEDURE — 99213 OFFICE O/P EST LOW 20 MIN: CPT | Performed by: PHYSICIAN ASSISTANT

## 2020-05-20 NOTE — PATIENT INSTRUCTIONS
Patient counseled regarding all progesterone only birth control. She would like to get Nexplanon   Advised to call the first day of menses to schedule insertion

## 2020-05-20 NOTE — PROGRESS NOTES
Subjective   Chief Complaint   Patient presents with   • Contraception     Would like to discuss birth control options       Kiley Hollingsworth is a 39 y.o. year old  presenting to be seen for contraception  She has recently reunited with  after a separation. No sexual activity yet  She is desiring long term birth control  Not a candidate for estrogen containing birth control with her hypertension   She has a regular monthly period and LMP 1 week ago  She had IUD previously which perforated uterus and had to be surgically removed    Past Medical History:   Diagnosis Date   • Anxiety    • Body piercing     BOTH EARS   • Borderline glaucoma    • Depression    • Diabetes mellitus (CMS/HCC)    • Encounter for insertion of mirena IUD 2014    HIRAL MUSA   • History of Papanicolaou smear of cervix 2015    NORMAL    • Hypertension    • Wears glasses         Current Outpatient Medications:   •  DULoxetine (CYMBALTA) 30 MG capsule, Take 30 mg by mouth Daily., Disp: , Rfl: 0  •  lisinopril (PRINIVIL,ZESTRIL) 10 MG tablet, , Disp: , Rfl: 0  •  metFORMIN (GLUCOPHAGE) 500 MG tablet, Take 500 mg by mouth 2 (Two) Times a Day With Meals., Disp: , Rfl: 0   No Known Allergies   Past Surgical History:   Procedure Laterality Date   •  SECTION PRIMARY  2014    DR JORJE MERAZ   • DIAGNOSTIC LAPAROSCOPY N/A 2018    Procedure: DIAGNOSTIC LAPAROSCOPY, RETRIEVAL OF FOREIGN BODY AND ALL INDICATED PROCEDURES;  Surgeon: Isaias Craig MD;  Location: [facility];  Service: Obstetrics/Gynecology   • ENDOSCOPY     • GALLBLADDER SURGERY     • LAPAROSCOPIC CHOLECYSTECTOMY     • WISDOM TOOTH EXTRACTION        Social History     Socioeconomic History   • Marital status:      Spouse name: Not on file   • Number of children: Not on file   • Years of education: Not on file   • Highest education level: Not on file   Tobacco Use   • Smoking status: Never Smoker   • Smokeless tobacco: Never Used  "  Substance and Sexual Activity   • Alcohol use: No     Frequency: Never   • Drug use: No   • Sexual activity: Not Currently     Partners: Male     Birth control/protection: None      Family History   Problem Relation Age of Onset   • Diabetes Father    • Diabetes Mother    • Coronary artery disease Mother    • Hypertension Mother    • Uterine cancer Paternal Grandmother    • Lung cancer Maternal Grandfather        Review of Systems   Constitutional: Negative for chills, diaphoresis and fever.   Gastrointestinal: Negative for abdominal pain, nausea and vomiting.   Genitourinary: Negative for difficulty urinating, dysuria, menstrual problem, pelvic pain and vaginal discharge.           Objective   /76   Ht 167.6 cm (66\")   Wt (!) 141 kg (310 lb 12.8 oz)   LMP 05/13/2020 (Exact Date)   Breastfeeding No   BMI 50.16 kg/m²     Physical Exam         Assessment and Plan  Kiley was seen today for contraception.    Diagnoses and all orders for this visit:    Encounter for initial prescription of implantable subdermal contraceptive      Patient Instructions   Patient counseled regarding all progesterone only birth control. She would like to get Nexplanon   Advised to call the first day of menses to schedule insertion               This note was electronically signed.    Kiley Peralta PA-C   May 20, 2020  "

## 2020-06-30 ENCOUNTER — PREP FOR SURGERY (OUTPATIENT)
Dept: OTHER | Facility: HOSPITAL | Age: 40
End: 2020-06-30

## 2020-06-30 ENCOUNTER — OFFICE VISIT (OUTPATIENT)
Dept: OBSTETRICS AND GYNECOLOGY | Facility: CLINIC | Age: 40
End: 2020-06-30

## 2020-06-30 VITALS
SYSTOLIC BLOOD PRESSURE: 140 MMHG | HEIGHT: 66 IN | BODY MASS INDEX: 47.09 KG/M2 | WEIGHT: 293 LBS | DIASTOLIC BLOOD PRESSURE: 78 MMHG

## 2020-06-30 DIAGNOSIS — Z30.2 ENCOUNTER FOR STERILIZATION: Primary | ICD-10-CM

## 2020-06-30 PROCEDURE — 99213 OFFICE O/P EST LOW 20 MIN: CPT | Performed by: PHYSICIAN ASSISTANT

## 2020-06-30 RX ORDER — SODIUM CHLORIDE 0.9 % (FLUSH) 0.9 %
10 SYRINGE (ML) INJECTION AS NEEDED
Status: CANCELLED | OUTPATIENT
Start: 2020-06-30

## 2020-06-30 RX ORDER — SODIUM CHLORIDE 0.9 % (FLUSH) 0.9 %
3 SYRINGE (ML) INJECTION EVERY 12 HOURS SCHEDULED
Status: CANCELLED | OUTPATIENT
Start: 2020-06-30

## 2020-06-30 NOTE — PATIENT INSTRUCTIONS
Patient is counseled regarding the permanency of a tubal ligation as well as a failure rate statistically of 8 out of 1000 tubal ligations.  She is counseled regarding potential complications including bleeding, infection, damage to pelvic or abdominal organs.  She is requesting tubal to be scheduled at the first available opening.

## 2020-06-30 NOTE — PROGRESS NOTES
Subjective   Chief Complaint   Patient presents with   • Consult     Tubal consult       Kiley Hollingsworth is a 39 y.o. year old  presenting to be seen for consult regarding tubal ligation.  Patient was seen in office several weeks ago and at that time had discussed Nexplanon however she has now decided she wants permanent birth control and she is requesting a tubal ligation.  Her last menstrual period was 2020  She has no complaints or concerns.    Past Medical History:   Diagnosis Date   • Anxiety    • Body piercing     BOTH EARS   • Borderline glaucoma    • Depression    • Diabetes mellitus (CMS/HCC)    • Encounter for insertion of mirena IUD 2014    HIRAL MUSA   • History of Papanicolaou smear of cervix 2015    NORMAL    • Hypertension    • Wears glasses         Current Outpatient Medications:   •  DULoxetine (CYMBALTA) 30 MG capsule, Take 30 mg by mouth Daily., Disp: , Rfl: 0  •  lisinopril (PRINIVIL,ZESTRIL) 10 MG tablet, , Disp: , Rfl: 0  •  metFORMIN (GLUCOPHAGE) 500 MG tablet, Take 500 mg by mouth 2 (Two) Times a Day With Meals., Disp: , Rfl: 0   No Known Allergies   Past Surgical History:   Procedure Laterality Date   •  SECTION PRIMARY  2014    DR JORJE MERAZ   • DIAGNOSTIC LAPAROSCOPY N/A 2018    Procedure: DIAGNOSTIC LAPAROSCOPY, RETRIEVAL OF FOREIGN BODY AND ALL INDICATED PROCEDURES;  Surgeon: Isaias Craig MD;  Location: [facility];  Service: Obstetrics/Gynecology   • ENDOSCOPY     • GALLBLADDER SURGERY     • LAPAROSCOPIC CHOLECYSTECTOMY     • WISDOM TOOTH EXTRACTION        Social History     Socioeconomic History   • Marital status:      Spouse name: Not on file   • Number of children: Not on file   • Years of education: Not on file   • Highest education level: Not on file   Tobacco Use   • Smoking status: Never Smoker   • Smokeless tobacco: Never Used   Substance and Sexual Activity   • Alcohol use: No     Frequency: Never   • Drug use: No  "  • Sexual activity: Not Currently     Partners: Male     Birth control/protection: None      Family History   Problem Relation Age of Onset   • Diabetes Father    • Diabetes Mother    • Coronary artery disease Mother    • Hypertension Mother    • Uterine cancer Paternal Grandmother    • Lung cancer Maternal Grandfather        Review of Systems   Constitutional: Negative for chills, fatigue and fever.   Gastrointestinal: Negative for abdominal pain, diarrhea, nausea and vomiting.   Genitourinary: Negative for difficulty urinating, dysuria, menstrual problem and pelvic pain.   Musculoskeletal: Negative.            Objective   /78   Ht 167.6 cm (66\")   Wt (!) 145 kg (320 lb 3.2 oz)   LMP 06/08/2020 (Exact Date)   Breastfeeding No   BMI 51.68 kg/m²     Physical Exam         Assessment and Plan  Kiley was seen today for consult.    Diagnoses and all orders for this visit:    Encounter for sterilization      Patient Instructions   Patient is counseled regarding the permanency of a tubal ligation as well as a failure rate statistically of 8 out of 1000 tubal ligations.  She is counseled regarding potential complications including bleeding, infection, damage to pelvic or abdominal organs.  She is requesting tubal to be scheduled at the first available opening.             This note was electronically signed.    Kiley Peralta PA-C   June 30, 2020  "

## 2020-07-06 PROBLEM — Z30.2 ENCOUNTER FOR STERILIZATION: Status: ACTIVE | Noted: 2020-07-06

## 2020-07-28 ENCOUNTER — APPOINTMENT (OUTPATIENT)
Dept: PREADMISSION TESTING | Facility: HOSPITAL | Age: 40
End: 2020-07-28

## 2020-07-28 VITALS
SYSTOLIC BLOOD PRESSURE: 131 MMHG | WEIGHT: 293 LBS | HEIGHT: 66 IN | DIASTOLIC BLOOD PRESSURE: 79 MMHG | OXYGEN SATURATION: 99 % | BODY MASS INDEX: 47.09 KG/M2 | HEART RATE: 86 BPM

## 2020-07-28 DIAGNOSIS — Z30.2 ENCOUNTER FOR STERILIZATION: ICD-10-CM

## 2020-07-28 LAB
ANION GAP SERPL CALCULATED.3IONS-SCNC: 9 MMOL/L (ref 5–15)
B-HCG UR QL: NEGATIVE
BASOPHILS # BLD AUTO: 0.07 10*3/MM3 (ref 0–0.2)
BASOPHILS NFR BLD AUTO: 0.5 % (ref 0–1.5)
BILIRUB UR QL STRIP: NEGATIVE
BUN SERPL-MCNC: 10 MG/DL (ref 6–20)
BUN/CREAT SERPL: 17.2 (ref 7–25)
CALCIUM SPEC-SCNC: 9.5 MG/DL (ref 8.6–10.5)
CHLORIDE SERPL-SCNC: 102 MMOL/L (ref 98–107)
CLARITY UR: CLEAR
CO2 SERPL-SCNC: 25 MMOL/L (ref 22–29)
COLOR UR: YELLOW
CREAT SERPL-MCNC: 0.58 MG/DL (ref 0.57–1)
DEPRECATED RDW RBC AUTO: 43.8 FL (ref 37–54)
EOSINOPHIL # BLD AUTO: 0.3 10*3/MM3 (ref 0–0.4)
EOSINOPHIL NFR BLD AUTO: 2.3 % (ref 0.3–6.2)
ERYTHROCYTE [DISTWIDTH] IN BLOOD BY AUTOMATED COUNT: 13.7 % (ref 12.3–15.4)
GFR SERPL CREATININE-BSD FRML MDRD: 116 ML/MIN/1.73
GLUCOSE SERPL-MCNC: 164 MG/DL (ref 65–99)
GLUCOSE UR STRIP-MCNC: ABNORMAL MG/DL
HCT VFR BLD AUTO: 36.8 % (ref 34–46.6)
HGB BLD-MCNC: 12 G/DL (ref 12–15.9)
HGB UR QL STRIP.AUTO: NEGATIVE
IMM GRANULOCYTES # BLD AUTO: 0.08 10*3/MM3 (ref 0–0.05)
IMM GRANULOCYTES NFR BLD AUTO: 0.6 % (ref 0–0.5)
KETONES UR QL STRIP: NEGATIVE
LEUKOCYTE ESTERASE UR QL STRIP.AUTO: NEGATIVE
LYMPHOCYTES # BLD AUTO: 2.27 10*3/MM3 (ref 0.7–3.1)
LYMPHOCYTES NFR BLD AUTO: 17.5 % (ref 19.6–45.3)
MCH RBC QN AUTO: 28.4 PG (ref 26.6–33)
MCHC RBC AUTO-ENTMCNC: 32.6 G/DL (ref 31.5–35.7)
MCV RBC AUTO: 87 FL (ref 79–97)
MONOCYTES # BLD AUTO: 0.68 10*3/MM3 (ref 0.1–0.9)
MONOCYTES NFR BLD AUTO: 5.2 % (ref 5–12)
NEUTROPHILS NFR BLD AUTO: 73.9 % (ref 42.7–76)
NEUTROPHILS NFR BLD AUTO: 9.58 10*3/MM3 (ref 1.7–7)
NITRITE UR QL STRIP: NEGATIVE
NRBC BLD AUTO-RTO: 0 /100 WBC (ref 0–0.2)
PH UR STRIP.AUTO: 5.5 [PH] (ref 5–8)
PLATELET # BLD AUTO: 346 10*3/MM3 (ref 140–450)
PMV BLD AUTO: 10.1 FL (ref 6–12)
POTASSIUM SERPL-SCNC: 4.2 MMOL/L (ref 3.5–5.2)
PROT UR QL STRIP: NEGATIVE
RBC # BLD AUTO: 4.23 10*6/MM3 (ref 3.77–5.28)
SODIUM SERPL-SCNC: 136 MMOL/L (ref 136–145)
SP GR UR STRIP: 1.02 (ref 1–1.03)
UROBILINOGEN UR QL STRIP: ABNORMAL
WBC # BLD AUTO: 12.98 10*3/MM3 (ref 3.4–10.8)

## 2020-07-28 PROCEDURE — C9803 HOPD COVID-19 SPEC COLLECT: HCPCS

## 2020-07-28 PROCEDURE — U0002 COVID-19 LAB TEST NON-CDC: HCPCS | Performed by: PHYSICIAN ASSISTANT

## 2020-07-28 PROCEDURE — 85025 COMPLETE CBC W/AUTO DIFF WBC: CPT | Performed by: PHYSICIAN ASSISTANT

## 2020-07-28 PROCEDURE — 80048 BASIC METABOLIC PNL TOTAL CA: CPT | Performed by: OBSTETRICS & GYNECOLOGY

## 2020-07-28 PROCEDURE — 93005 ELECTROCARDIOGRAM TRACING: CPT

## 2020-07-28 PROCEDURE — 36415 COLL VENOUS BLD VENIPUNCTURE: CPT

## 2020-07-28 PROCEDURE — 81025 URINE PREGNANCY TEST: CPT | Performed by: OBSTETRICS & GYNECOLOGY

## 2020-07-28 PROCEDURE — U0004 COV-19 TEST NON-CDC HGH THRU: HCPCS | Performed by: PHYSICIAN ASSISTANT

## 2020-07-28 PROCEDURE — 81003 URINALYSIS AUTO W/O SCOPE: CPT | Performed by: PHYSICIAN ASSISTANT

## 2020-07-28 RX ORDER — CHLORAL HYDRATE 500 MG
1000 CAPSULE ORAL
COMMUNITY
End: 2023-03-11

## 2020-07-28 RX ORDER — LANOLIN ALCOHOL/MO/W.PET/CERES
500 CREAM (GRAM) TOPICAL NIGHTLY
COMMUNITY

## 2020-07-29 LAB
REF LAB TEST METHOD: NORMAL
SARS-COV-2 RNA RESP QL NAA+PROBE: NOT DETECTED

## 2020-07-31 ENCOUNTER — ANESTHESIA (OUTPATIENT)
Dept: PERIOP | Facility: HOSPITAL | Age: 40
End: 2020-07-31

## 2020-07-31 ENCOUNTER — HOSPITAL ENCOUNTER (OUTPATIENT)
Facility: HOSPITAL | Age: 40
Setting detail: HOSPITAL OUTPATIENT SURGERY
Discharge: HOME | End: 2020-07-31
Attending: OBSTETRICS & GYNECOLOGY | Admitting: OBSTETRICS & GYNECOLOGY

## 2020-07-31 ENCOUNTER — ANESTHESIA EVENT (OUTPATIENT)
Dept: PERIOP | Facility: HOSPITAL | Age: 40
End: 2020-07-31

## 2020-07-31 VITALS
OXYGEN SATURATION: 97 % | SYSTOLIC BLOOD PRESSURE: 157 MMHG | DIASTOLIC BLOOD PRESSURE: 80 MMHG | TEMPERATURE: 98.7 F | HEART RATE: 88 BPM | RESPIRATION RATE: 17 BRPM

## 2020-07-31 DIAGNOSIS — Z30.2 ENCOUNTER FOR STERILIZATION: Primary | ICD-10-CM

## 2020-07-31 LAB — GLUCOSE BLDC GLUCOMTR-MCNC: 194 MG/DL (ref 70–130)

## 2020-07-31 PROCEDURE — 25010000002 ONDANSETRON PER 1 MG: Performed by: NURSE ANESTHETIST, CERTIFIED REGISTERED

## 2020-07-31 PROCEDURE — 25010000002 PROPOFOL 200 MG/20ML EMULSION: Performed by: NURSE ANESTHETIST, CERTIFIED REGISTERED

## 2020-07-31 PROCEDURE — 94799 UNLISTED PULMONARY SVC/PX: CPT

## 2020-07-31 PROCEDURE — 25010000002 SUCCINYLCHOLINE PER 20 MG: Performed by: NURSE ANESTHETIST, CERTIFIED REGISTERED

## 2020-07-31 PROCEDURE — 25010000002 DEXAMETHASONE PER 1 MG: Performed by: NURSE ANESTHETIST, CERTIFIED REGISTERED

## 2020-07-31 PROCEDURE — 25010000002 FENTANYL CITRATE (PF) 250 MCG/5ML SOLUTION: Performed by: NURSE ANESTHETIST, CERTIFIED REGISTERED

## 2020-07-31 PROCEDURE — 58671 LAPAROSCOPY TUBAL BLOCK: CPT | Performed by: OBSTETRICS & GYNECOLOGY

## 2020-07-31 PROCEDURE — 82962 GLUCOSE BLOOD TEST: CPT

## 2020-07-31 DEVICE — CLIP FALLOP FILSHIE TI PR STRL BX/20: Type: IMPLANTABLE DEVICE | Site: ABDOMEN | Status: FUNCTIONAL

## 2020-07-31 RX ORDER — ROCURONIUM BROMIDE 10 MG/ML
INJECTION, SOLUTION INTRAVENOUS AS NEEDED
Status: DISCONTINUED | OUTPATIENT
Start: 2020-07-31 | End: 2020-07-31 | Stop reason: SURG

## 2020-07-31 RX ORDER — SODIUM CHLORIDE 0.9 % (FLUSH) 0.9 %
3 SYRINGE (ML) INJECTION EVERY 12 HOURS SCHEDULED
Status: DISCONTINUED | OUTPATIENT
Start: 2020-07-31 | End: 2020-07-31 | Stop reason: HOSPADM

## 2020-07-31 RX ORDER — SODIUM CHLORIDE, SODIUM LACTATE, POTASSIUM CHLORIDE, CALCIUM CHLORIDE 600; 310; 30; 20 MG/100ML; MG/100ML; MG/100ML; MG/100ML
1000 INJECTION, SOLUTION INTRAVENOUS CONTINUOUS
Status: DISCONTINUED | OUTPATIENT
Start: 2020-07-31 | End: 2020-07-31 | Stop reason: HOSPADM

## 2020-07-31 RX ORDER — DEXAMETHASONE SODIUM PHOSPHATE 4 MG/ML
INJECTION, SOLUTION INTRA-ARTICULAR; INTRALESIONAL; INTRAMUSCULAR; INTRAVENOUS; SOFT TISSUE AS NEEDED
Status: DISCONTINUED | OUTPATIENT
Start: 2020-07-31 | End: 2020-07-31 | Stop reason: SURG

## 2020-07-31 RX ORDER — HYDROCODONE BITARTRATE AND ACETAMINOPHEN 5; 325 MG/1; MG/1
1 TABLET ORAL EVERY 6 HOURS PRN
Qty: 4 TABLET | Refills: 0 | OUTPATIENT
Start: 2020-07-31 | End: 2023-03-11

## 2020-07-31 RX ORDER — FENTANYL CITRATE 50 UG/ML
INJECTION, SOLUTION INTRAMUSCULAR; INTRAVENOUS AS NEEDED
Status: DISCONTINUED | OUTPATIENT
Start: 2020-07-31 | End: 2020-07-31 | Stop reason: SURG

## 2020-07-31 RX ORDER — SUCCINYLCHOLINE CHLORIDE 20 MG/ML
INJECTION INTRAMUSCULAR; INTRAVENOUS AS NEEDED
Status: DISCONTINUED | OUTPATIENT
Start: 2020-07-31 | End: 2020-07-31 | Stop reason: SURG

## 2020-07-31 RX ORDER — SODIUM CHLORIDE 0.9 % (FLUSH) 0.9 %
10 SYRINGE (ML) INJECTION AS NEEDED
Status: DISCONTINUED | OUTPATIENT
Start: 2020-07-31 | End: 2020-07-31 | Stop reason: HOSPADM

## 2020-07-31 RX ORDER — ONDANSETRON 4 MG/1
4 TABLET, FILM COATED ORAL ONCE AS NEEDED
Status: DISCONTINUED | OUTPATIENT
Start: 2020-07-31 | End: 2020-07-31 | Stop reason: HOSPADM

## 2020-07-31 RX ORDER — MAGNESIUM HYDROXIDE 1200 MG/15ML
LIQUID ORAL AS NEEDED
Status: DISCONTINUED | OUTPATIENT
Start: 2020-07-31 | End: 2020-07-31 | Stop reason: HOSPADM

## 2020-07-31 RX ORDER — IBUPROFEN 600 MG/1
600 TABLET ORAL EVERY 6 HOURS PRN
Status: DISCONTINUED | OUTPATIENT
Start: 2020-07-31 | End: 2020-07-31 | Stop reason: HOSPADM

## 2020-07-31 RX ORDER — PROPOFOL 10 MG/ML
INJECTION, EMULSION INTRAVENOUS AS NEEDED
Status: DISCONTINUED | OUTPATIENT
Start: 2020-07-31 | End: 2020-07-31 | Stop reason: SURG

## 2020-07-31 RX ORDER — HYDROCODONE BITARTRATE AND ACETAMINOPHEN 5; 325 MG/1; MG/1
1 TABLET ORAL ONCE AS NEEDED
Status: DISCONTINUED | OUTPATIENT
Start: 2020-07-31 | End: 2020-07-31 | Stop reason: HOSPADM

## 2020-07-31 RX ORDER — IBUPROFEN 600 MG/1
600 TABLET ORAL EVERY 6 HOURS PRN
Qty: 30 TABLET | Refills: 1 | OUTPATIENT
Start: 2020-07-31 | End: 2023-03-11

## 2020-07-31 RX ORDER — ONDANSETRON 2 MG/ML
INJECTION INTRAMUSCULAR; INTRAVENOUS AS NEEDED
Status: DISCONTINUED | OUTPATIENT
Start: 2020-07-31 | End: 2020-07-31 | Stop reason: SURG

## 2020-07-31 RX ADMIN — SUCCINYLCHOLINE CHLORIDE 160 MG: 20 INJECTION, SOLUTION INTRAMUSCULAR; INTRAVENOUS at 08:47

## 2020-07-31 RX ADMIN — PROPOFOL 180 MG: 10 INJECTION, EMULSION INTRAVENOUS at 08:47

## 2020-07-31 RX ADMIN — SUGAMMADEX 300 MG: 100 INJECTION, SOLUTION INTRAVENOUS at 09:13

## 2020-07-31 RX ADMIN — SODIUM CHLORIDE, POTASSIUM CHLORIDE, SODIUM LACTATE AND CALCIUM CHLORIDE 1000 ML: 600; 310; 30; 20 INJECTION, SOLUTION INTRAVENOUS at 08:01

## 2020-07-31 RX ADMIN — ONDANSETRON 4 MG: 2 INJECTION INTRAMUSCULAR; INTRAVENOUS at 08:48

## 2020-07-31 RX ADMIN — DEXAMETHASONE SODIUM PHOSPHATE 8 MG: 4 INJECTION, SOLUTION INTRAMUSCULAR; INTRAVENOUS at 08:48

## 2020-07-31 RX ADMIN — FENTANYL CITRATE 100 MCG: 50 INJECTION, SOLUTION INTRAMUSCULAR; INTRAVENOUS at 08:43

## 2020-07-31 RX ADMIN — ROCURONIUM BROMIDE 35 MG: 10 INJECTION INTRAVENOUS at 08:55

## 2020-07-31 NOTE — ANESTHESIA POSTPROCEDURE EVALUATION
Patient: Kiley Díaz Hollingsworth    Procedure Summary     Date:  07/31/20 Room / Location:  [facility] OR 2 /  [facility] OR    Anesthesia Start:  0842 Anesthesia Stop:  0925    Procedure:  TUBAL FALLOPE FILSHIE CLIPPING LAPAROSCOPIC (Bilateral Abdomen) Diagnosis:       Encounter for sterilization      (Encounter for sterilization [Z30.2])    Surgeon:  Dimitrios Simon MD Provider:  Jose M Orozco CRNA    Anesthesia Type:  general ASA Status:  3          Anesthesia Type: general    Vitals  Vitals Value Taken Time   /59 7/31/2020  9:25 AM   Temp     Pulse 92 7/31/2020  9:25 AM   Resp     SpO2 100 % 7/31/2020  9:25 AM   Vitals shown include unvalidated device data.        Post Anesthesia Care and Evaluation    Patient location during evaluation: PACU  Patient participation: complete - patient participated  Level of consciousness: awake and alert and sleepy but conscious  Pain score: 2  Pain management: adequate  Airway patency: patent  Anesthetic complications: No anesthetic complications  PONV Status: none  Cardiovascular status: acceptable  Respiratory status: acceptable and face mask  Hydration status: acceptable

## 2020-07-31 NOTE — ANESTHESIA PROCEDURE NOTES
Airway  Urgency: elective    Date/Time: 7/31/2020 8:48 AM  Airway not difficult    General Information and Staff    Patient location during procedure: OR  CRNA: Jose M Orozco CRNA    Indications and Patient Condition  Indications for airway management: airway protection    Preoxygenated: yes      Final Airway Details  Final airway type: endotracheal airway      Successful airway: ETT  Cuffed: yes   Successful intubation technique: direct laryngoscopy  Endotracheal tube insertion site: oral  Blade: Gray  Blade size: 2  ETT size (mm): 7.5  Cormack-Lehane Classification: grade I - full view of glottis  Placement verified by: chest auscultation and capnometry   Number of attempts at approach: 1

## 2020-07-31 NOTE — ANESTHESIA PREPROCEDURE EVALUATION
Anesthesia Evaluation     Patient summary reviewed and Nursing notes reviewed   NPO Solid Status: > 8 hours  NPO Liquid Status: > 8 hours           Airway   Mallampati: II  TM distance: >3 FB  Neck ROM: full  No difficulty expected  Dental      Pulmonary    (+) sleep apnea,   Cardiovascular     (+) hypertension, hyperlipidemia,       Neuro/Psych  (+) psychiatric history Anxiety and Depression,     GI/Hepatic/Renal/Endo    (+) obesity, morbid obesity,      Musculoskeletal     Abdominal    Substance History      OB/GYN          Other                        Anesthesia Plan    ASA 3     general     intravenous induction     Anesthetic plan, all risks, benefits, and alternatives have been provided, discussed and informed consent has been obtained with: patient.    Plan discussed with CRNA.

## 2020-08-07 ENCOUNTER — OFFICE VISIT (OUTPATIENT)
Dept: OBSTETRICS AND GYNECOLOGY | Facility: CLINIC | Age: 40
End: 2020-08-07

## 2020-08-07 VITALS
SYSTOLIC BLOOD PRESSURE: 144 MMHG | HEIGHT: 66 IN | DIASTOLIC BLOOD PRESSURE: 84 MMHG | BODY MASS INDEX: 47.09 KG/M2 | WEIGHT: 293 LBS

## 2020-08-07 DIAGNOSIS — Z09 POSTOP CHECK: ICD-10-CM

## 2020-08-07 DIAGNOSIS — Z12.39 SCREENING FOR BREAST CANCER: ICD-10-CM

## 2020-08-07 DIAGNOSIS — R39.15 URINARY URGENCY: Primary | ICD-10-CM

## 2020-08-07 LAB
GLUCOSE UR STRIP-MCNC: NEGATIVE MG/DL
KETONES UR QL: NEGATIVE
LEUKOCYTE EST, POC: NEGATIVE
NITRITE UR-MCNC: NEGATIVE MG/ML
PROT UR STRIP-MCNC: NEGATIVE MG/DL
RBC # UR STRIP: NEGATIVE /UL

## 2020-08-07 PROCEDURE — 99024 POSTOP FOLLOW-UP VISIT: CPT | Performed by: MIDWIFE

## 2020-08-07 RX ORDER — DULOXETIN HYDROCHLORIDE 60 MG/1
CAPSULE, DELAYED RELEASE ORAL
COMMUNITY
Start: 2020-05-12 | End: 2023-03-11

## 2020-08-07 NOTE — PROGRESS NOTES
"Subjective   Chief Complaint   Patient presents with   • Post-op     1 week post BTL, stitches removed, c/o urinary urgency and lump under left arm      Kiley Hollingsworth is a 39 y.o. year old  presenting to be seen for her post-operative visit.  Currently she reports no problems with eating, bowel movements, voiding, or wound drainage and pain is well controlled.    There was no pathology result associated with Kiley's recent procedure.      OTHER COMPLAINTS:  a pea sized lump under left armpit that has been there for several months    The following portions of the patient's history were reviewed and updated as appropriate:current medications and allergies       Objective   /84   Ht 168.3 cm (66.25\")   Wt (!) 150 kg (331 lb)   LMP 2020 (Within Weeks)   Breastfeeding No   BMI 53.02 kg/m²     General:  well developed; well nourished  no acute distress   Abdomen: soft, non-tender; no masses  no umbilical or inguinal hernias are present  incision is healing   Pelvis: Not performed.               Breast:  Small <.5cm round mobile lesion in left armpit, nontender       Assessment   1. S/P BTL with Filshie clips     Plan   1. May return to full activity with no restrictions  2. Meds were prescribed - no  3. Follow up for annual   4. mammogram    No orders of the defined types were placed in this encounter.         This note was electronically signed.  Pauline Walter, ABIGAIL  2020  "

## 2020-10-28 ENCOUNTER — HOSPITAL ENCOUNTER (OUTPATIENT)
Dept: MAMMOGRAPHY | Facility: HOSPITAL | Age: 40
Discharge: HOME | End: 2020-10-28
Admitting: MIDWIFE

## 2020-10-28 DIAGNOSIS — Z12.39 SCREENING FOR BREAST CANCER: ICD-10-CM

## 2020-10-28 PROCEDURE — 77063 BREAST TOMOSYNTHESIS BI: CPT

## 2020-10-28 PROCEDURE — 77067 SCR MAMMO BI INCL CAD: CPT

## 2024-06-27 ENCOUNTER — TRANSCRIBE ORDERS (OUTPATIENT)
Dept: ADMINISTRATIVE | Facility: HOSPITAL | Age: 44
End: 2024-06-27
Payer: COMMERCIAL

## 2024-06-27 DIAGNOSIS — R41.3 MEMORY LOSS: Primary | ICD-10-CM

## 2024-07-12 ENCOUNTER — HOSPITAL ENCOUNTER (OUTPATIENT)
Dept: CT IMAGING | Facility: HOSPITAL | Age: 44
Discharge: HOME | End: 2024-07-12
Admitting: NURSE PRACTITIONER
Payer: COMMERCIAL

## 2024-07-12 DIAGNOSIS — R41.3 MEMORY LOSS: ICD-10-CM

## 2024-07-12 PROCEDURE — 25510000001 IOPAMIDOL 61 % SOLUTION: Performed by: NURSE PRACTITIONER

## 2024-07-12 PROCEDURE — 70470 CT HEAD/BRAIN W/O & W/DYE: CPT

## 2024-07-12 RX ADMIN — IOPAMIDOL 85 ML: 612 INJECTION, SOLUTION INTRAVENOUS at 15:45

## 2024-07-23 ENCOUNTER — TRANSCRIBE ORDERS (OUTPATIENT)
Dept: ADMINISTRATIVE | Facility: HOSPITAL | Age: 44
End: 2024-07-23
Payer: COMMERCIAL

## 2024-07-23 DIAGNOSIS — H90.5 CENTRAL HEARING LOSS: Primary | ICD-10-CM

## 2024-08-04 ENCOUNTER — HOSPITAL ENCOUNTER (OUTPATIENT)
Dept: MRI IMAGING | Facility: HOSPITAL | Age: 44
Discharge: HOME | End: 2024-08-04
Admitting: OTOLARYNGOLOGY
Payer: COMMERCIAL

## 2024-08-04 DIAGNOSIS — H90.5 CENTRAL HEARING LOSS: ICD-10-CM

## 2024-08-04 LAB — CREAT BLDA-MCNC: 0.6 MG/DL (ref 0.6–1.3)

## 2024-08-04 PROCEDURE — A9577 INJ MULTIHANCE: HCPCS | Performed by: OTOLARYNGOLOGY

## 2024-08-04 PROCEDURE — 82565 ASSAY OF CREATININE: CPT

## 2024-08-04 PROCEDURE — 70553 MRI BRAIN STEM W/O & W/DYE: CPT | Performed by: RADIOLOGY

## 2024-08-04 PROCEDURE — 70553 MRI BRAIN STEM W/O & W/DYE: CPT

## 2024-08-04 PROCEDURE — 0 GADOBENATE DIMEGLUMINE 529 MG/ML SOLUTION: Performed by: OTOLARYNGOLOGY

## 2024-08-04 RX ADMIN — GADOBENATE DIMEGLUMINE 20 ML: 529 INJECTION, SOLUTION INTRAVENOUS at 13:59

## 2024-08-08 ENCOUNTER — TRANSCRIBE ORDERS (OUTPATIENT)
Dept: ADMINISTRATIVE | Facility: HOSPITAL | Age: 44
End: 2024-08-08
Payer: COMMERCIAL

## 2024-08-08 DIAGNOSIS — Z12.31 VISIT FOR SCREENING MAMMOGRAM: Primary | ICD-10-CM

## 2024-08-20 ENCOUNTER — HOSPITAL ENCOUNTER (OUTPATIENT)
Dept: MAMMOGRAPHY | Facility: HOSPITAL | Age: 44
Discharge: HOME | End: 2024-08-20
Admitting: NURSE PRACTITIONER
Payer: COMMERCIAL

## 2024-08-20 DIAGNOSIS — Z12.31 VISIT FOR SCREENING MAMMOGRAM: ICD-10-CM

## 2024-08-20 PROCEDURE — 77063 BREAST TOMOSYNTHESIS BI: CPT

## 2024-08-20 PROCEDURE — 77067 SCR MAMMO BI INCL CAD: CPT

## (undated) DEVICE — ENDOPATH XCEL UNIVERSAL TROCAR STABLILITY SLEEVES: Brand: ENDOPATH XCEL

## (undated) DEVICE — GLV SURG SENSICARE ALOE LF PF SZ7.5 GRN

## (undated) DEVICE — ENDOPATH XCEL BLADELESS TROCARS WITH STABILITY SLEEVES: Brand: ENDOPATH XCEL

## (undated) DEVICE — GLV SURG BIOGEL PI ULTRATOUCH G SZ7.5 LF

## (undated) DEVICE — SPNG GZ STRL 2S 4X4 12PLY

## (undated) DEVICE — GLV SURG BIOGEL M LTX PF 8

## (undated) DEVICE — CUFF SCD HEMOFORCE SEQ CALF STD MD

## (undated) DEVICE — STRIP,CLOSURE,WOUND,MEDI-STRIP,1/2X4: Brand: MEDLINE

## (undated) DEVICE — RICH LAVH: Brand: MEDLINE INDUSTRIES, INC.

## (undated) DEVICE — CATHETER,URETHRAL,REDRUBBER,STRL,16FR: Brand: MEDLINE

## (undated) DEVICE — 3M™ STERI-STRIP™ REINFORCED ADHESIVE SKIN CLOSURES, R1547, 1/2 IN X 4 IN (12 MM X 100 MM), 6 STRIPS/ENVELOPE: Brand: 3M™ STERI-STRIP™

## (undated) DEVICE — SUT ETHLN 4/0 PS2 PLSTC 1667G

## (undated) DEVICE — SPNG GZ WOVN 4X4IN 12PLY 10/BX STRL

## (undated) DEVICE — SUT MNCRYL PLS ANTIB UD 3/0 PS2 27IN